# Patient Record
Sex: MALE | Race: WHITE | Employment: FULL TIME | URBAN - METROPOLITAN AREA
[De-identification: names, ages, dates, MRNs, and addresses within clinical notes are randomized per-mention and may not be internally consistent; named-entity substitution may affect disease eponyms.]

---

## 2018-10-01 ENCOUNTER — OFFICE VISIT (OUTPATIENT)
Dept: FAMILY MEDICINE CLINIC | Age: 23
End: 2018-10-01
Payer: COMMERCIAL

## 2018-10-01 VITALS
RESPIRATION RATE: 20 BRPM | WEIGHT: 181 LBS | BODY MASS INDEX: 26.81 KG/M2 | HEART RATE: 88 BPM | OXYGEN SATURATION: 98 % | TEMPERATURE: 98.6 F | SYSTOLIC BLOOD PRESSURE: 122 MMHG | DIASTOLIC BLOOD PRESSURE: 68 MMHG | HEIGHT: 69 IN

## 2018-10-01 DIAGNOSIS — R51.9 ACUTE NONINTRACTABLE HEADACHE, UNSPECIFIED HEADACHE TYPE: Primary | ICD-10-CM

## 2018-10-01 PROCEDURE — 1036F TOBACCO NON-USER: CPT | Performed by: FAMILY MEDICINE

## 2018-10-01 PROCEDURE — G8484 FLU IMMUNIZE NO ADMIN: HCPCS | Performed by: FAMILY MEDICINE

## 2018-10-01 PROCEDURE — G8419 CALC BMI OUT NRM PARAM NOF/U: HCPCS | Performed by: FAMILY MEDICINE

## 2018-10-01 PROCEDURE — G8427 DOCREV CUR MEDS BY ELIG CLIN: HCPCS | Performed by: FAMILY MEDICINE

## 2018-10-01 PROCEDURE — 99202 OFFICE O/P NEW SF 15 MIN: CPT | Performed by: FAMILY MEDICINE

## 2018-10-01 RX ORDER — IBUPROFEN 800 MG/1
800 TABLET ORAL EVERY 8 HOURS PRN
Qty: 30 TABLET | Refills: 0 | Status: SHIPPED | OUTPATIENT
Start: 2018-10-01 | End: 2019-11-13

## 2018-10-01 RX ORDER — SUMATRIPTAN 50 MG/1
50 TABLET, FILM COATED ORAL
Qty: 9 TABLET | Refills: 0 | Status: SHIPPED
Start: 2018-10-01 | End: 2020-08-15

## 2018-10-01 ASSESSMENT — ENCOUNTER SYMPTOMS
EYE PAIN: 0
ABDOMINAL PAIN: 0
SINUS PAIN: 0
SINUS PRESSURE: 0
PHOTOPHOBIA: 0
RHINORRHEA: 0
SHORTNESS OF BREATH: 0

## 2018-10-01 NOTE — PATIENT INSTRUCTIONS
Patient Education        Headache: Care Instructions  Your Care Instructions    Headaches have many possible causes. Most headaches aren't a sign of a more serious problem, and they will get better on their own. Home treatment may help you feel better faster. The doctor has checked you carefully, but problems can develop later. If you notice any problems or new symptoms, get medical treatment right away. Follow-up care is a key part of your treatment and safety. Be sure to make and go to all appointments, and call your doctor if you are having problems. It's also a good idea to know your test results and keep a list of the medicines you take. How can you care for yourself at home? · Do not drive if you have taken a prescription pain medicine. · Rest in a quiet, dark room until your headache is gone. Close your eyes and try to relax or go to sleep. Don't watch TV or read. · Put a cold, moist cloth or cold pack on the painful area for 10 to 20 minutes at a time. Put a thin cloth between the cold pack and your skin. · Use a warm, moist towel or a heating pad set on low to relax tight shoulder and neck muscles. · Have someone gently massage your neck and shoulders. · Take pain medicines exactly as directed. ¨ If the doctor gave you a prescription medicine for pain, take it as prescribed. ¨ If you are not taking a prescription pain medicine, ask your doctor if you can take an over-the-counter medicine. · Be careful not to take pain medicine more often than the instructions allow, because you may get worse or more frequent headaches when the medicine wears off. · Do not ignore new symptoms that occur with a headache, such as a fever, weakness or numbness, vision changes, or confusion. These may be signs of a more serious problem. To prevent headaches  · Keep a headache diary so you can figure out what triggers your headaches. Avoiding triggers may help you prevent headaches.  Record when each headache began,

## 2018-10-01 NOTE — PROGRESS NOTES
10/1/2018     Chief Complaint   Patient presents with    Migraine     x 2 days       Washington (:  1995) is a 23 y.o. male, here for concern for headache for the past 2 days. Reports a past medical history migraines. He reports onset of symptoms approximately 2 days prior. He reports bilateral pressure sensation behind his eyes. He reports associated nausea and vomiting. He denies any photophobia or phonophobia. Denies any blurry vision, numbness, tingling or weakness. He has tried ibuprofen with minimal relief. He reports similar episodes in the past which usually resolve on their own. Review of Systems   Constitutional: Positive for chills. Negative for fever. HENT: Negative for congestion, rhinorrhea, sinus pain and sinus pressure. Eyes: Negative for photophobia, pain and visual disturbance. Respiratory: Negative for shortness of breath. Cardiovascular: Negative for chest pain. Gastrointestinal: Negative for abdominal pain. Neurological: Positive for headaches. Negative for light-headedness and numbness. Prior to Visit Medications    Not on File        Social History   Substance Use Topics    Smoking status: Never Smoker    Smokeless tobacco: Never Used    Alcohol use No      Comment: rarely        Vitals:    10/01/18 1549   BP: 122/68   Site: Right Upper Arm   Position: Sitting   Cuff Size: Medium Adult   Pulse: 88   Resp: 20   Temp: 98.6 °F (37 °C)   TempSrc: Oral   SpO2: 98%   Weight: 181 lb (82.1 kg)   Height: 5' 9\" (1.753 m)     Estimated body mass index is 26.73 kg/m² as calculated from the following:    Height as of this encounter: 5' 9\" (1.753 m). Weight as of this encounter: 181 lb (82.1 kg). Physical Exam   Constitutional: He is oriented to person, place, and time. He appears well-developed and well-nourished. No distress. HENT:   Head: Normocephalic and atraumatic. Eyes: Pupils are equal, round, and reactive to light.  EOM are normal.

## 2019-11-13 ENCOUNTER — HOSPITAL ENCOUNTER (EMERGENCY)
Age: 24
Discharge: HOME | End: 2019-11-13
Attending: EMERGENCY MEDICINE

## 2019-11-13 VITALS
RESPIRATION RATE: 17 BRPM | OXYGEN SATURATION: 99 % | BODY MASS INDEX: 25.76 KG/M2 | HEART RATE: 88 BPM | DIASTOLIC BLOOD PRESSURE: 77 MMHG | HEIGHT: 68 IN | TEMPERATURE: 98.3 F | SYSTOLIC BLOOD PRESSURE: 157 MMHG | WEIGHT: 170 LBS

## 2019-11-13 DIAGNOSIS — Z71.1 FEARED CONDITION NOT DEMONSTRATED: Primary | ICD-10-CM

## 2019-11-13 DIAGNOSIS — R19.7 DIARRHEA, UNSPECIFIED TYPE: ICD-10-CM

## 2019-11-13 LAB
ALBUMIN SERPL-MCNC: 4.4 G/DL (ref 3.5–5.2)
ALP BLD-CCNC: 69 U/L (ref 40–129)
ALT SERPL-CCNC: 19 U/L (ref 0–40)
ANION GAP SERPL CALCULATED.3IONS-SCNC: 12 MMOL/L (ref 7–16)
APTT: 33.3 SEC (ref 24.5–35.1)
AST SERPL-CCNC: 24 U/L (ref 0–39)
BASOPHILS ABSOLUTE: 0.07 E9/L (ref 0–0.2)
BASOPHILS RELATIVE PERCENT: 1.1 % (ref 0–2)
BILIRUB SERPL-MCNC: 1.3 MG/DL (ref 0–1.2)
BILIRUBIN URINE: NEGATIVE
BLOOD, URINE: NEGATIVE
BUN BLDV-MCNC: 10 MG/DL (ref 6–20)
CALCIUM SERPL-MCNC: 9.5 MG/DL (ref 8.6–10.2)
CHLORIDE BLD-SCNC: 103 MMOL/L (ref 98–107)
CLARITY: CLEAR
CO2: 25 MMOL/L (ref 22–29)
COLOR: YELLOW
CREAT SERPL-MCNC: 1 MG/DL (ref 0.7–1.2)
EOSINOPHILS ABSOLUTE: 0.16 E9/L (ref 0.05–0.5)
EOSINOPHILS RELATIVE PERCENT: 2.6 % (ref 0–6)
GFR AFRICAN AMERICAN: >60
GFR NON-AFRICAN AMERICAN: >60 ML/MIN/1.73
GLUCOSE BLD-MCNC: 114 MG/DL (ref 74–99)
GLUCOSE URINE: NEGATIVE MG/DL
HCT VFR BLD CALC: 51.5 % (ref 37–54)
HEMOGLOBIN: 17.5 G/DL (ref 12.5–16.5)
IMMATURE GRANULOCYTES #: 0.01 E9/L
IMMATURE GRANULOCYTES %: 0.2 % (ref 0–5)
INR BLD: 0.9
KETONES, URINE: NEGATIVE MG/DL
LACTIC ACID: 1.2 MMOL/L (ref 0.5–2.2)
LEUKOCYTE ESTERASE, URINE: NEGATIVE
LIPASE: 24 U/L (ref 13–60)
LYMPHOCYTES ABSOLUTE: 1.69 E9/L (ref 1.5–4)
LYMPHOCYTES RELATIVE PERCENT: 27.4 % (ref 20–42)
MCH RBC QN AUTO: 28.5 PG (ref 26–35)
MCHC RBC AUTO-ENTMCNC: 34 % (ref 32–34.5)
MCV RBC AUTO: 83.7 FL (ref 80–99.9)
MONOCYTES ABSOLUTE: 0.96 E9/L (ref 0.1–0.95)
MONOCYTES RELATIVE PERCENT: 15.6 % (ref 2–12)
NEUTROPHILS ABSOLUTE: 3.27 E9/L (ref 1.8–7.3)
NEUTROPHILS RELATIVE PERCENT: 53.1 % (ref 43–80)
NITRITE, URINE: NEGATIVE
PDW BLD-RTO: 12.4 FL (ref 11.5–15)
PH UA: 6.5 (ref 5–9)
PLATELET # BLD: 279 E9/L (ref 130–450)
PMV BLD AUTO: 11 FL (ref 7–12)
POTASSIUM SERPL-SCNC: 4.1 MMOL/L (ref 3.5–5)
PROTEIN UA: NEGATIVE MG/DL
PROTHROMBIN TIME: 10.5 SEC (ref 9.3–12.4)
RBC # BLD: 6.15 E12/L (ref 3.8–5.8)
SODIUM BLD-SCNC: 140 MMOL/L (ref 132–146)
SPECIFIC GRAVITY UA: 1.01 (ref 1–1.03)
TOTAL PROTEIN: 7.8 G/DL (ref 6.4–8.3)
UROBILINOGEN, URINE: 1 E.U./DL
WBC # BLD: 6.2 E9/L (ref 4.5–11.5)

## 2019-11-13 PROCEDURE — 85610 PROTHROMBIN TIME: CPT

## 2019-11-13 PROCEDURE — 83690 ASSAY OF LIPASE: CPT

## 2019-11-13 PROCEDURE — 85025 COMPLETE CBC W/AUTO DIFF WBC: CPT

## 2019-11-13 PROCEDURE — 81003 URINALYSIS AUTO W/O SCOPE: CPT

## 2019-11-13 PROCEDURE — 80053 COMPREHEN METABOLIC PANEL: CPT

## 2019-11-13 PROCEDURE — 83605 ASSAY OF LACTIC ACID: CPT

## 2019-11-13 PROCEDURE — 85730 THROMBOPLASTIN TIME PARTIAL: CPT

## 2019-11-13 PROCEDURE — 36415 COLL VENOUS BLD VENIPUNCTURE: CPT

## 2019-11-13 PROCEDURE — 99284 EMERGENCY DEPT VISIT MOD MDM: CPT

## 2020-08-15 ENCOUNTER — HOSPITAL ENCOUNTER (EMERGENCY)
Age: 25
Discharge: HOME | End: 2020-08-15
Attending: EMERGENCY MEDICINE

## 2020-08-15 VITALS
HEIGHT: 68 IN | WEIGHT: 170 LBS | BODY MASS INDEX: 25.76 KG/M2 | TEMPERATURE: 97.7 F | DIASTOLIC BLOOD PRESSURE: 70 MMHG | OXYGEN SATURATION: 99 % | SYSTOLIC BLOOD PRESSURE: 120 MMHG | HEART RATE: 90 BPM | RESPIRATION RATE: 16 BRPM

## 2020-08-15 PROCEDURE — 6370000000 HC RX 637 (ALT 250 FOR IP): Performed by: EMERGENCY MEDICINE

## 2020-08-15 PROCEDURE — 99282 EMERGENCY DEPT VISIT SF MDM: CPT

## 2020-08-15 PROCEDURE — 99283 EMERGENCY DEPT VISIT LOW MDM: CPT

## 2020-08-15 RX ORDER — AMOXICILLIN AND CLAVULANATE POTASSIUM 875; 125 MG/1; MG/1
1 TABLET, FILM COATED ORAL ONCE
Status: COMPLETED | OUTPATIENT
Start: 2020-08-15 | End: 2020-08-15

## 2020-08-15 RX ORDER — AMOXICILLIN AND CLAVULANATE POTASSIUM 875; 125 MG/1; MG/1
1 TABLET, FILM COATED ORAL 2 TIMES DAILY
Qty: 14 TABLET | Refills: 0 | Status: SHIPPED | OUTPATIENT
Start: 2020-08-15 | End: 2020-08-22

## 2020-08-15 RX ADMIN — AMOXICILLIN AND CLAVULANATE POTASSIUM 1 TABLET: 875; 125 TABLET, FILM COATED ORAL at 20:29

## 2020-08-15 ASSESSMENT — ENCOUNTER SYMPTOMS
BACK PAIN: 0
NAUSEA: 0
VOMITING: 0
ABDOMINAL PAIN: 0
SORE THROAT: 1
SHORTNESS OF BREATH: 0
EYE REDNESS: 0
COUGH: 0
EYE DISCHARGE: 0
EYE PAIN: 0
SINUS PRESSURE: 0
DIARRHEA: 0
WHEEZING: 0

## 2020-08-15 ASSESSMENT — PAIN SCALES - GENERAL: PAINLEVEL_OUTOF10: 5

## 2020-08-15 ASSESSMENT — PAIN DESCRIPTION - PAIN TYPE: TYPE: ACUTE PAIN

## 2020-08-15 ASSESSMENT — PAIN DESCRIPTION - LOCATION: LOCATION: THROAT

## 2020-08-15 ASSESSMENT — PAIN DESCRIPTION - DESCRIPTORS: DESCRIPTORS: DISCOMFORT

## 2020-08-15 NOTE — LETTER
[address] [facility] Emergency Department  [id number] [facility] [address]  Phone: [phone]               August 15, 2020    Patient: Washington   YOB: 1995   Date of Visit: 8/15/2020       To Whom It May Concern:    Washington was seen and treated in our emergency department on 8/15/2020. He may return to work on 08/16/2020.       Sincerely,       Jam Elder RN         Signature:__________________________________

## 2020-08-16 NOTE — ED PROVIDER NOTES
SORE THROAT FOR WEEK NO DROOLING NO FEVER NO ABDOMINAL PAIN NO RASH NO HEADACHE NO VOMITING    The history is provided by the patient. Review of Systems   Constitutional: Negative for chills and fever. HENT: Positive for sore throat. Negative for ear pain and sinus pressure. Eyes: Negative for pain, discharge and redness. Respiratory: Negative for cough, shortness of breath and wheezing. Cardiovascular: Negative for chest pain. Gastrointestinal: Negative for abdominal pain, diarrhea, nausea and vomiting. Genitourinary: Negative for dysuria and frequency. Musculoskeletal: Negative for arthralgias and back pain. Skin: Negative for rash and wound. Neurological: Negative for weakness and headaches. Hematological: Negative for adenopathy. All other systems reviewed and are negative. Physical Exam  Vitals signs and nursing note reviewed. Constitutional:       Appearance: He is well-developed. HENT:      Head: Normocephalic and atraumatic. Mouth/Throat:      Lips: Pink. Mouth: Mucous membranes are moist.      Dentition: Normal dentition. Tongue: No lesions. Tongue does not deviate from midline. Palate: No mass and lesions. Pharynx: Pharyngeal swelling and posterior oropharyngeal erythema present. No oropharyngeal exudate or uvula swelling. Tonsils: No tonsillar exudate or tonsillar abscesses. 2+ on the right. Eyes:      Pupils: Pupils are equal, round, and reactive to light. Neck:      Musculoskeletal: Normal range of motion and neck supple. Cardiovascular:      Rate and Rhythm: Normal rate and regular rhythm. Heart sounds: Normal heart sounds. Pulmonary:      Effort: Pulmonary effort is normal. No respiratory distress. Breath sounds: Normal breath sounds. No wheezing or rales. Abdominal:      General: Bowel sounds are normal.      Palpations: Abdomen is soft. Tenderness: There is no abdominal tenderness.  There is no guarding or rebound. Skin:     General: Skin is warm and dry. Neurological:      Mental Status: He is alert and oriented to person, place, and time. Cranial Nerves: No cranial nerve deficit. Coordination: Coordination normal.         Procedures    MDM        --------------------------------------------- PAST HISTORY ---------------------------------------------  Past Medical History:  has no past medical history on file. Past Surgical History:  has no past surgical history on file. Social History:  reports that he has never smoked. He has never used smokeless tobacco. He reports that he does not drink alcohol or use drugs. Family History: family history includes Kidney Disease in his mother. The patients home medications have been reviewed. Allergies: Other    -------------------------------------------------- RESULTS -------------------------------------------------  No results found for this visit on 08/15/20. No orders to display       ------------------------- NURSING NOTES AND VITALS REVIEWED ---------------------------   The nursing notes within the ED encounter and vital signs as below have been reviewed. /70   Pulse 90   Temp 97.7 °F (36.5 °C) (Temporal)   Resp 16   Ht 5' 8\" (1.727 m)   Wt 170 lb (77.1 kg)   SpO2 99%   BMI 25.85 kg/m²   Oxygen Saturation Interpretation: Normal      ------------------------------------------ PROGRESS NOTES ------------------------------------------   I have spoken with the patient and discussed todays results, in addition to providing specific details for the plan of care and counseling regarding the diagnosis and prognosis.   Their questions are answered at this time and they are agreeable with the plan.      --------------------------------- ADDITIONAL PROVIDER NOTES ---------------------------------     DISCUSSED POSSIBLE MONO AND TO AVOID ABDOMINAL TRAUMA AND GET TESTED FOR MONO IF NOT IMPROVED OR WORSE     This patient is stable for discharge. I have shared the specific conditions for return, as well as the importance of follow-up.           --------------------------------- IMPRESSION AND DISPOSITION ---------------------------------    IMPRESSION  1. Acute pharyngitis, unspecified etiology        DISPOSITION  Disposition: Discharge to home  Patient condition is good        Labs      Radiology      EKG Interpretation.           Ronn Oviedo MD  08/15/20 2032

## 2022-12-12 ENCOUNTER — HOSPITAL ENCOUNTER (EMERGENCY)
Age: 27
Discharge: HOME | End: 2022-12-12

## 2022-12-12 ENCOUNTER — APPOINTMENT (OUTPATIENT)
Dept: GENERAL RADIOLOGY | Age: 27
End: 2022-12-12

## 2022-12-12 VITALS
SYSTOLIC BLOOD PRESSURE: 127 MMHG | HEART RATE: 91 BPM | RESPIRATION RATE: 16 BRPM | HEIGHT: 68 IN | TEMPERATURE: 98.7 F | OXYGEN SATURATION: 99 % | BODY MASS INDEX: 31.43 KG/M2 | WEIGHT: 207.4 LBS | DIASTOLIC BLOOD PRESSURE: 89 MMHG

## 2022-12-12 DIAGNOSIS — R09.81 CONGESTION OF NASAL SINUS: Primary | ICD-10-CM

## 2022-12-12 PROCEDURE — 99283 EMERGENCY DEPT VISIT LOW MDM: CPT

## 2022-12-12 PROCEDURE — 71045 X-RAY EXAM CHEST 1 VIEW: CPT

## 2022-12-12 RX ORDER — LORATADINE 10 MG/1
10 TABLET ORAL DAILY
Qty: 30 TABLET | Refills: 0 | Status: SHIPPED | OUTPATIENT
Start: 2022-12-12

## 2022-12-12 RX ORDER — GUAIFENESIN 100 MG/5ML
200 SYRUP ORAL 3 TIMES DAILY PRN
Qty: 118 ML | Refills: 0 | Status: SHIPPED | OUTPATIENT
Start: 2022-12-12

## 2022-12-12 ASSESSMENT — PAIN - FUNCTIONAL ASSESSMENT: PAIN_FUNCTIONAL_ASSESSMENT: NONE - DENIES PAIN

## 2022-12-12 NOTE — ED PROVIDER NOTES
Department of Emergency Medicine  FIRST PROVIDER TRIAGE NOTE             Independent MLP           12/12/22  5:42 PM EST    Date of Encounter: 12/12/22   MRN: [id number]      HPI: Milton Byrnes is a 32 y.o. male who presents to the ED for Cough (For a couple months. )   Presents with a 2 month history of a persistent cough that is worse in the morning and at at night. He reports he is coughing up a copious amount of clear sputum only in the morning at night. He reports the cough is so bad that he occasionally becomes nauseated. He denies any fevers, chills, body aches. He reports in October the cough seemed to worsen and has been persistent since that time. Denies any known sick contacts. Denies any recent history of smoking, reports he did smoke briefly as a teenager. Does not vape, does not know of marijuana. He reports he has \"used every cough medication under the sun\" without much relief. ROS: Negative for cp, sob, fever, or dizziness. PE: Gen Appearance/Constitutional: alert  HEENT: NC/NT. PERRLA,  Airway patent. Neck: supple  CV: regular rate  Pulm: CTA bilat  GI: soft and NT  Musculoskeletal: moves all extremities x 4  Lymphatics: no edema     Initial Plan of Care: All treatment areas with department are currently occupied. Plan to order/Initiate the following while awaiting opening in ED: imaging studies.   Initiate Treatment-Testing, Proceed toTreatment Area When Bed Available for ED Attending/MLP to Continue Care    Electronically signed by GRECIA Martinez CNP   DD: 12/12/22      GRECIA Martinez CNP  12/12/22 0572

## 2022-12-12 NOTE — Clinical Note
Don To was seen and treated in our emergency department on 12/12/2022. He may return to work on 12/14/2022. If you have any questions or concerns, please don't hesitate to call.       Sinai Hopkins, APRN - CNP

## 2022-12-13 NOTE — ED PROVIDER NOTES
[facility]  Department of Emergency Medicine   ED  Encounter Note  Admit Date/RoomTime: 2022  7:01 PM  ED Room: [facility]    NAME: Damián Neely  : 1995  MRN: [id number]     Chief Complaint:  Cough (For a couple months. )    History of Present Illness       [facility] Maida Pham is a 32 y.o. old male who presents to the emergency department by private vehicle, for rhinorrhea and cough, which began 2 month(s) prior to arrival.  Since onset the symptoms have been waxing and waning and mild in severity. The symptoms are associated with sneezing. There has been no additional symptoms as it relates to today's visit. The patient has not received any COVID-19 vaccine. Any exposure to COVID or concern for COVID at this time he denies any fever chills nausea vomiting chest pain shortness of breath. Patient states that he does not smoke. ROS   Pertinent positives and negatives are stated within HPI, all other systems reviewed and are negative. Past Medical History:  has no past medical history on file. Surgical History:  has no past surgical history on file. Social History:  reports that he has never smoked. He has never used smokeless tobacco. He reports that he does not drink alcohol and does not use drugs. Family History: family history includes Kidney Disease in his mother. Allergies: Other    Physical Exam   Oxygen Saturation Interpretation: Normal on room air analysis. ED Triage Vitals   BP Temp Temp Source Heart Rate Resp SpO2 Height Weight   22 1556 22 1556 22 1743 22 1556 22 1556 22 1556 22 1743 22 1743   (!) 147/103 98.3 °F (36.8 °C) Oral 100 16 98 % 5' 8\" (1.727 m) 207 lb 6.4 oz (94.1 kg)         Constitutional:  Alert, development consistent with age. Ears:  External Ears: Bilateral normal.               TM's & External Canals: normal TM's and external ear canals bilaterally.   Nose: There is clear rhinorrhea. Sinuses: no bilateral maxillary sinus tenderness. no bilateral frontal sinus tenderness. Mouth:  normal tongue and buccal mucosa. Throat: mild erythema. Airway patent. Neck:  Supple. No meningeal signs. There is no  anterior cervical node tenderness. Respiratory:   Breath sounds: bilateral normal.  Lung sounds: normal.   CV:  Regular rate and rhythm, normal heart sounds, without pathological murmurs, ectopy, gallops, or rubs. GI:  Abdomen Soft, nontender, good bowel sounds. No firm or pulsatile mass. Integument:  Normal turgor. Warm, dry, without visible rash. Neurological:  Oriented. Motor functions intact. Lab / Imaging Results   (All laboratory and radiology results have been personally reviewed by myself)  Labs:  No results found for this visit on 12/12/22. Imaging: All Radiology results interpreted by Radiologist unless otherwise noted. XR CHEST PORTABLE   Final Result   No evidence of active cardiopulmonary pathology. ED Course / Medical Decision Making   Medications - No data to display     Re-examination:  12/12/22       Time: 1905  Patients condition is improving after treatment. Consults:   None    Procedures:   None    Medical Decision Making: At this time the patient is without objective evidence of an acute process requiring hospitalization or inpatient management. They have remained hemodynamically stable throughout their entire ED visit and are stable for discharge with outpatient follow-up. The plan has been discussed in detail and they are aware of the specific conditions for emergent return, as well as the importance of follow-up. States that he has had sinus congestion and sneezing without intermittent cough for the last 2 months. He states that he does not smoke he denies any fever chills nausea vomiting diarrhea. He denies any ear congestion.   Patient states that he does not know of any allergies that he knows of but notices that when the weather changes he has had worsening sneezing. Patient will be placed on Claritin for possible seasonal allergies as well as Robitussin for his cough. Patient's chest x-ray is clear there is no signs of pneumonia. Patient at this time is agreeable to plan of care all questions were answered. Patient nontoxic in appearance and in no distress. Assessment     1. Congestion of nasal sinus      Plan   Discharged home. Patient condition is good    New Medications     New Prescriptions    GUAIFENESIN (ROBITUSSIN) 100 MG/5ML LIQUID    Take 10 mLs by mouth 3 times daily as needed for Cough or Congestion    LORATADINE (CLARITIN) 10 MG TABLET    Take 1 tablet by mouth daily     Electronically signed by GRECIA Kimbrough CNP   DD: 12/12/22  **This report was transcribed using voice recognition software. Every effort was made to ensure accuracy; however, inadvertent computerized transcription errors may be present.   END OF ED PROVIDER NOTE         GRECIA Tilley CNP  12/12/22 [id number]

## 2023-05-25 ENCOUNTER — HOSPITAL ENCOUNTER (EMERGENCY)
Age: 28
Discharge: HOME | End: 2023-05-25
Attending: EMERGENCY MEDICINE
Payer: MEDICAID

## 2023-05-25 ENCOUNTER — APPOINTMENT (OUTPATIENT)
Dept: GENERAL RADIOLOGY | Age: 28
End: 2023-05-25
Payer: MEDICAID

## 2023-05-25 VITALS
RESPIRATION RATE: 16 BRPM | WEIGHT: 210 LBS | BODY MASS INDEX: 31.83 KG/M2 | HEIGHT: 68 IN | TEMPERATURE: 98.5 F | SYSTOLIC BLOOD PRESSURE: 132 MMHG | OXYGEN SATURATION: 100 % | DIASTOLIC BLOOD PRESSURE: 88 MMHG | HEART RATE: 98 BPM

## 2023-05-25 DIAGNOSIS — S90.211A SUBUNGUAL HEMATOMA OF GREAT TOE OF RIGHT FOOT, INITIAL ENCOUNTER: ICD-10-CM

## 2023-05-25 DIAGNOSIS — S90.31XA CONTUSION OF RIGHT FOOT, INITIAL ENCOUNTER: Primary | ICD-10-CM

## 2023-05-25 PROCEDURE — 6370000000 HC RX 637 (ALT 250 FOR IP): Performed by: EMERGENCY MEDICINE

## 2023-05-25 PROCEDURE — 73630 X-RAY EXAM OF FOOT: CPT

## 2023-05-25 PROCEDURE — 99283 EMERGENCY DEPT VISIT LOW MDM: CPT

## 2023-05-25 RX ORDER — ACETAMINOPHEN 325 MG/1
650 TABLET ORAL EVERY 6 HOURS PRN
COMMUNITY

## 2023-05-25 RX ORDER — DOXYCYCLINE HYCLATE 100 MG
100 TABLET ORAL 2 TIMES DAILY
Qty: 14 TABLET | Refills: 0 | Status: SHIPPED | OUTPATIENT
Start: 2023-05-25 | End: 2023-06-01

## 2023-05-25 RX ORDER — DOXYCYCLINE HYCLATE 100 MG/1
100 CAPSULE ORAL ONCE
Status: COMPLETED | OUTPATIENT
Start: 2023-05-25 | End: 2023-05-25

## 2023-05-25 RX ADMIN — DOXYCYCLINE HYCLATE 100 MG: 100 CAPSULE ORAL at 20:45

## 2023-05-25 ASSESSMENT — PAIN DESCRIPTION - FREQUENCY: FREQUENCY: CONTINUOUS

## 2023-05-25 ASSESSMENT — PAIN DESCRIPTION - DESCRIPTORS: DESCRIPTORS: ACHING

## 2023-05-25 ASSESSMENT — PAIN SCALES - GENERAL: PAINLEVEL_OUTOF10: 2

## 2023-05-25 ASSESSMENT — PAIN - FUNCTIONAL ASSESSMENT
PAIN_FUNCTIONAL_ASSESSMENT: 0-10
PAIN_FUNCTIONAL_ASSESSMENT: PREVENTS OR INTERFERES SOME ACTIVE ACTIVITIES AND ADLS

## 2023-05-25 ASSESSMENT — PAIN DESCRIPTION - ONSET: ONSET: ON-GOING

## 2023-05-25 ASSESSMENT — PAIN DESCRIPTION - LOCATION: LOCATION: FOOT

## 2023-05-25 ASSESSMENT — PAIN DESCRIPTION - PAIN TYPE: TYPE: ACUTE PAIN

## 2023-05-25 ASSESSMENT — PAIN DESCRIPTION - ORIENTATION: ORIENTATION: RIGHT

## 2023-05-25 NOTE — ED PROVIDER NOTES
documented        Patient/Family acknowledges discharge/final diagnosis (acute crush injury right foot with total subungual hematoma involving the right great toe (self trephinated prior to ED arrival)) the importance of timely follow-up (orthopedic surgeon Dr. Shona Hogan) with strict return precautions (development of fever chills redness of right foot increased discomfort right foot). Shared Decision Making: Shared decision making concerning antibiotic selection disposition follow-up-patient preferred no pain medication    Code Status Discussion: Patient is a full code                 CRITICAL CARE TIME   Total Critical Care time was minutes, excluding separately reportable procedures. There was a high probability of clinically significant/life threatening deterioration in the patient's condition which required my urgent intervention. PROCEDURES:    Procedures       ED BEDSIDE ULTRASOUND:   Performed by ED Physician - none      FINAL IMPRESSION     1. Contusion of right foot, initial encounter    2. Subungual hematoma of great toe of right foot, initial encounter          DISPOSITION/PLAN   DISPOSITION Decision To Discharge 05/25/2023 08:56:48 PM      PATIENT REFERRED TO:  Juaquin Rivas [address]  [id number] [place] [address] [facility]    Call in 3 days        DISCHARGE MEDICATIONS:  Discharge Medication List as of 5/25/2023  8:56 PM        START taking these medications    Details   doxycycline hyclate (VIBRA-TABS) 100 MG tablet Take 1 tablet by mouth 2 times daily for 7 days, Disp-14 tablet, R-0Normal           Controlled Substances Monitoring:     No flowsheet data found.     (Please note that portions of this note were completed with a voice recognition program.  Efforts were made to edit the dictations but occasionally words are mis-transcribed.)    Joceline Jennings MD (electronically signed)  Attending Emergency Physician            Joceline Jennings MD  05/26/23 7754

## 2023-09-14 ENCOUNTER — OFFICE VISIT (OUTPATIENT)
Dept: PRIMARY CARE CLINIC | Age: 28
End: 2023-09-14
Payer: MEDICAID

## 2023-09-14 VITALS
WEIGHT: 206.2 LBS | HEART RATE: 87 BPM | OXYGEN SATURATION: 97 % | TEMPERATURE: 98.4 F | DIASTOLIC BLOOD PRESSURE: 88 MMHG | BODY MASS INDEX: 31.25 KG/M2 | HEIGHT: 68 IN | SYSTOLIC BLOOD PRESSURE: 122 MMHG

## 2023-09-14 DIAGNOSIS — K21.9 CHRONIC GERD: ICD-10-CM

## 2023-09-14 DIAGNOSIS — J45.30 MILD PERSISTENT ASTHMA WITHOUT COMPLICATION: Primary | ICD-10-CM

## 2023-09-14 DIAGNOSIS — Z00.00 HEALTHCARE MAINTENANCE: ICD-10-CM

## 2023-09-14 PROCEDURE — 99204 OFFICE O/P NEW MOD 45 MIN: CPT | Performed by: INTERNAL MEDICINE

## 2023-09-14 RX ORDER — ALBUTEROL SULFATE 90 UG/1
2 AEROSOL, METERED RESPIRATORY (INHALATION) EVERY 6 HOURS PRN
Qty: 18 G | Refills: 1 | Status: SHIPPED | OUTPATIENT
Start: 2023-09-14

## 2023-09-14 RX ORDER — OMEPRAZOLE 20 MG/1
20 CAPSULE, DELAYED RELEASE ORAL
Qty: 90 CAPSULE | Refills: 0 | Status: SHIPPED | OUTPATIENT
Start: 2023-09-14

## 2023-09-14 SDOH — ECONOMIC STABILITY: FOOD INSECURITY: WITHIN THE PAST 12 MONTHS, YOU WORRIED THAT YOUR FOOD WOULD RUN OUT BEFORE YOU GOT MONEY TO BUY MORE.: NEVER TRUE

## 2023-09-14 SDOH — ECONOMIC STABILITY: INCOME INSECURITY: HOW HARD IS IT FOR YOU TO PAY FOR THE VERY BASICS LIKE FOOD, HOUSING, MEDICAL CARE, AND HEATING?: NOT VERY HARD

## 2023-09-14 SDOH — ECONOMIC STABILITY: FOOD INSECURITY: WITHIN THE PAST 12 MONTHS, THE FOOD YOU BOUGHT JUST DIDN'T LAST AND YOU DIDN'T HAVE MONEY TO GET MORE.: NEVER TRUE

## 2023-09-14 SDOH — ECONOMIC STABILITY: HOUSING INSECURITY
IN THE LAST 12 MONTHS, WAS THERE A TIME WHEN YOU DID NOT HAVE A STEADY PLACE TO SLEEP OR SLEPT IN A SHELTER (INCLUDING NOW)?: NO

## 2023-09-14 ASSESSMENT — PATIENT HEALTH QUESTIONNAIRE - PHQ9
SUM OF ALL RESPONSES TO PHQ QUESTIONS 1-9: 0
2. FEELING DOWN, DEPRESSED OR HOPELESS: 0
SUM OF ALL RESPONSES TO PHQ QUESTIONS 1-9: 0
1. LITTLE INTEREST OR PLEASURE IN DOING THINGS: 0
SUM OF ALL RESPONSES TO PHQ9 QUESTIONS 1 & 2: 0

## 2023-09-15 ENCOUNTER — HOSPITAL ENCOUNTER (OUTPATIENT)
Age: 28
Discharge: HOME | End: 2023-09-15
Payer: MEDICAID

## 2023-09-15 DIAGNOSIS — Z00.00 HEALTHCARE MAINTENANCE: ICD-10-CM

## 2023-09-15 DIAGNOSIS — K21.9 CHRONIC GERD: ICD-10-CM

## 2023-09-15 LAB
ALBUMIN SERPL-MCNC: 4.5 G/DL (ref 3.5–5.2)
ALP SERPL-CCNC: 73 U/L (ref 40–129)
ALT SERPL-CCNC: 79 U/L (ref 0–40)
ANION GAP SERPL CALCULATED.3IONS-SCNC: 12 MMOL/L (ref 7–16)
AST SERPL-CCNC: 36 U/L (ref 0–39)
BASOPHILS # BLD: 0.07 K/UL (ref 0–0.2)
BASOPHILS NFR BLD: 1 % (ref 0–2)
BILIRUB SERPL-MCNC: 1.2 MG/DL (ref 0–1.2)
BUN SERPL-MCNC: 13 MG/DL (ref 6–20)
CALCIUM SERPL-MCNC: 9 MG/DL (ref 8.6–10.2)
CHLORIDE SERPL-SCNC: 102 MMOL/L (ref 98–107)
CO2 SERPL-SCNC: 25 MMOL/L (ref 22–29)
CREAT SERPL-MCNC: 1 MG/DL (ref 0.7–1.2)
EOSINOPHIL # BLD: 0.25 K/UL (ref 0.05–0.5)
EOSINOPHILS RELATIVE PERCENT: 4 % (ref 0–6)
ERYTHROCYTE [DISTWIDTH] IN BLOOD BY AUTOMATED COUNT: 11.9 % (ref 11.5–15)
GFR SERPL CREATININE-BSD FRML MDRD: >60 ML/MIN/1.73M2
GLUCOSE SERPL-MCNC: 101 MG/DL (ref 74–99)
HCT VFR BLD AUTO: 48.8 % (ref 37–54)
HCV AB SERPL QL IA: NONREACTIVE
HGB BLD-MCNC: 16.4 G/DL (ref 12.5–16.5)
HIV 1+2 AB+HIV1 P24 AG SERPL QL IA: NONREACTIVE
IMM GRANULOCYTES # BLD AUTO: <0.03 K/UL (ref 0–0.58)
IMM GRANULOCYTES NFR BLD: 0 % (ref 0–5)
LYMPHOCYTES NFR BLD: 2.21 K/UL (ref 1.5–4)
LYMPHOCYTES RELATIVE PERCENT: 39 % (ref 20–42)
MCH RBC QN AUTO: 28.3 PG (ref 26–35)
MCHC RBC AUTO-ENTMCNC: 33.6 G/DL (ref 32–34.5)
MCV RBC AUTO: 84.3 FL (ref 80–99.9)
MONOCYTES NFR BLD: 0.86 K/UL (ref 0.1–0.95)
MONOCYTES NFR BLD: 15 % (ref 2–12)
NEUTROPHILS NFR BLD: 40 % (ref 43–80)
NEUTS SEG NFR BLD: 2.29 K/UL (ref 1.8–7.3)
PLATELET # BLD AUTO: 277 K/UL (ref 130–450)
PMV BLD AUTO: 11.2 FL (ref 7–12)
POTASSIUM SERPL-SCNC: 3.8 MMOL/L (ref 3.5–5)
PROT SERPL-MCNC: 7.5 G/DL (ref 6.4–8.3)
RBC # BLD AUTO: 5.79 M/UL (ref 3.8–5.8)
SODIUM SERPL-SCNC: 139 MMOL/L (ref 132–146)
WBC OTHER # BLD: 5.7 K/UL (ref 4.5–11.5)

## 2023-09-15 PROCEDURE — 80053 COMPREHEN METABOLIC PANEL: CPT

## 2023-09-15 PROCEDURE — 86803 HEPATITIS C AB TEST: CPT

## 2023-09-15 PROCEDURE — 85025 COMPLETE CBC W/AUTO DIFF WBC: CPT

## 2023-09-15 PROCEDURE — 36415 COLL VENOUS BLD VENIPUNCTURE: CPT

## 2023-09-15 PROCEDURE — 87389 HIV-1 AG W/HIV-1&-2 AB AG IA: CPT

## 2023-10-05 ENCOUNTER — OFFICE VISIT (OUTPATIENT)
Dept: PRIMARY CARE CLINIC | Age: 28
End: 2023-10-05
Payer: MEDICAID

## 2023-10-05 VITALS
TEMPERATURE: 98.8 F | HEIGHT: 68 IN | OXYGEN SATURATION: 98 % | WEIGHT: 204.5 LBS | DIASTOLIC BLOOD PRESSURE: 84 MMHG | BODY MASS INDEX: 30.99 KG/M2 | SYSTOLIC BLOOD PRESSURE: 126 MMHG | HEART RATE: 98 BPM

## 2023-10-05 DIAGNOSIS — J45.30 MILD PERSISTENT ASTHMA WITHOUT COMPLICATION: Primary | ICD-10-CM

## 2023-10-05 DIAGNOSIS — K21.9 CHRONIC GERD: ICD-10-CM

## 2023-10-05 PROCEDURE — 99213 OFFICE O/P EST LOW 20 MIN: CPT | Performed by: INTERNAL MEDICINE

## 2023-10-05 RX ORDER — DILTIAZEM HYDROCHLORIDE 60 MG/1
2 TABLET, FILM COATED ORAL 2 TIMES DAILY
Qty: 10.2 G | Refills: 1
Start: 2023-10-05

## 2025-08-18 PROCEDURE — 99283 EMERGENCY DEPT VISIT LOW MDM: CPT

## 2025-08-18 PROCEDURE — 99284 EMERGENCY DEPT VISIT MOD MDM: CPT

## 2025-08-19 ENCOUNTER — HOSPITAL ENCOUNTER (EMERGENCY)
Age: 30
Discharge: HOME | End: 2025-08-19
Attending: STUDENT IN AN ORGANIZED HEALTH CARE EDUCATION/TRAINING PROGRAM
Payer: COMMERCIAL

## 2025-08-19 VITALS
TEMPERATURE: 98.6 F | RESPIRATION RATE: 20 BRPM | SYSTOLIC BLOOD PRESSURE: 139 MMHG | DIASTOLIC BLOOD PRESSURE: 94 MMHG | HEART RATE: 88 BPM | OXYGEN SATURATION: 95 %

## 2025-08-19 DIAGNOSIS — K02.9 DENTAL CARIES: Primary | ICD-10-CM

## 2025-08-19 DIAGNOSIS — K04.7 DENTAL ABSCESS: ICD-10-CM

## 2025-08-19 PROCEDURE — 96372 THER/PROPH/DIAG INJ SC/IM: CPT

## 2025-08-19 PROCEDURE — 6360000002 HC RX W HCPCS: Performed by: STUDENT IN AN ORGANIZED HEALTH CARE EDUCATION/TRAINING PROGRAM

## 2025-08-19 PROCEDURE — 6370000000 HC RX 637 (ALT 250 FOR IP): Performed by: STUDENT IN AN ORGANIZED HEALTH CARE EDUCATION/TRAINING PROGRAM

## 2025-08-19 RX ORDER — NAPROXEN 500 MG/1
500 TABLET ORAL 2 TIMES DAILY
Qty: 60 TABLET | Refills: 0 | Status: SHIPPED | OUTPATIENT
Start: 2025-08-19

## 2025-08-19 RX ORDER — HYDROCODONE BITARTRATE AND ACETAMINOPHEN 5; 325 MG/1; MG/1
1 TABLET ORAL ONCE
Status: COMPLETED | OUTPATIENT
Start: 2025-08-19 | End: 2025-08-19

## 2025-08-19 RX ORDER — DEXAMETHASONE SODIUM PHOSPHATE 10 MG/ML
10 INJECTION, SOLUTION INTRA-ARTICULAR; INTRALESIONAL; INTRAMUSCULAR; INTRAVENOUS; SOFT TISSUE ONCE
Status: COMPLETED | OUTPATIENT
Start: 2025-08-19 | End: 2025-08-19

## 2025-08-19 RX ORDER — KETOROLAC TROMETHAMINE 30 MG/ML
30 INJECTION, SOLUTION INTRAMUSCULAR; INTRAVENOUS ONCE
Status: COMPLETED | OUTPATIENT
Start: 2025-08-19 | End: 2025-08-19

## 2025-08-19 RX ADMIN — AMOXICILLIN AND CLAVULANATE POTASSIUM 1 TABLET: 875; 125 TABLET, FILM COATED ORAL at 01:17

## 2025-08-19 RX ADMIN — DEXAMETHASONE SODIUM PHOSPHATE 10 MG: 10 INJECTION INTRAMUSCULAR; INTRAVENOUS at 01:17

## 2025-08-19 RX ADMIN — HYDROCODONE BITARTRATE AND ACETAMINOPHEN 1 TABLET: 5; 325 TABLET ORAL at 01:17

## 2025-08-19 RX ADMIN — KETOROLAC TROMETHAMINE 30 MG: 30 INJECTION, SOLUTION INTRAMUSCULAR at 01:17

## 2025-08-19 ASSESSMENT — LIFESTYLE VARIABLES
HOW MANY STANDARD DRINKS CONTAINING ALCOHOL DO YOU HAVE ON A TYPICAL DAY: PATIENT DOES NOT DRINK
HOW OFTEN DO YOU HAVE A DRINK CONTAINING ALCOHOL: NEVER

## 2025-08-19 ASSESSMENT — PAIN - FUNCTIONAL ASSESSMENT: PAIN_FUNCTIONAL_ASSESSMENT: 0-10

## 2025-08-19 ASSESSMENT — PAIN SCALES - GENERAL: PAINLEVEL_OUTOF10: 9

## 2025-08-19 ASSESSMENT — PAIN DESCRIPTION - DESCRIPTORS: DESCRIPTORS: THROBBING;SHARP;DISCOMFORT

## 2025-08-19 ASSESSMENT — PAIN DESCRIPTION - LOCATION: LOCATION: TEETH
